# Patient Record
Sex: MALE | Race: WHITE
[De-identification: names, ages, dates, MRNs, and addresses within clinical notes are randomized per-mention and may not be internally consistent; named-entity substitution may affect disease eponyms.]

---

## 2019-10-21 ENCOUNTER — HOSPITAL ENCOUNTER (OUTPATIENT)
Dept: HOSPITAL 92 - ERS | Age: 74
Setting detail: OBSERVATION
LOS: 1 days | Discharge: HOME | End: 2019-10-22
Attending: HOSPITALIST | Admitting: HOSPITALIST
Payer: COMMERCIAL

## 2019-10-21 VITALS — BODY MASS INDEX: 27.6 KG/M2

## 2019-10-21 DIAGNOSIS — Z88.5: ICD-10-CM

## 2019-10-21 DIAGNOSIS — Z95.5: ICD-10-CM

## 2019-10-21 DIAGNOSIS — Z91.041: ICD-10-CM

## 2019-10-21 DIAGNOSIS — F17.220: ICD-10-CM

## 2019-10-21 DIAGNOSIS — Z79.51: ICD-10-CM

## 2019-10-21 DIAGNOSIS — Z79.02: ICD-10-CM

## 2019-10-21 DIAGNOSIS — I25.2: ICD-10-CM

## 2019-10-21 DIAGNOSIS — Z79.899: ICD-10-CM

## 2019-10-21 DIAGNOSIS — K59.00: ICD-10-CM

## 2019-10-21 DIAGNOSIS — Z95.810: ICD-10-CM

## 2019-10-21 DIAGNOSIS — N18.9: ICD-10-CM

## 2019-10-21 DIAGNOSIS — I25.10: ICD-10-CM

## 2019-10-21 DIAGNOSIS — Z88.2: ICD-10-CM

## 2019-10-21 DIAGNOSIS — J44.9: ICD-10-CM

## 2019-10-21 DIAGNOSIS — K62.5: Primary | ICD-10-CM

## 2019-10-21 DIAGNOSIS — Z88.8: ICD-10-CM

## 2019-10-21 DIAGNOSIS — E78.5: ICD-10-CM

## 2019-10-21 DIAGNOSIS — I12.9: ICD-10-CM

## 2019-10-21 DIAGNOSIS — K22.70: ICD-10-CM

## 2019-10-21 LAB
ALBUMIN SERPL BCG-MCNC: 4.5 G/DL (ref 3.4–4.8)
ALP SERPL-CCNC: 45 U/L (ref 40–110)
ALT SERPL W P-5'-P-CCNC: 19 U/L (ref 8–55)
ANION GAP SERPL CALC-SCNC: 10 MMOL/L (ref 10–20)
AST SERPL-CCNC: 25 U/L (ref 5–34)
BASOPHILS # BLD AUTO: 0 THOU/UL (ref 0–0.2)
BASOPHILS NFR BLD AUTO: 0.4 % (ref 0–1)
BILIRUB SERPL-MCNC: 0.7 MG/DL (ref 0.2–1.2)
BUN SERPL-MCNC: 24 MG/DL (ref 8.4–25.7)
CALCIUM SERPL-MCNC: 9.5 MG/DL (ref 7.8–10.44)
CHLORIDE SERPL-SCNC: 103 MMOL/L (ref 98–107)
CO2 SERPL-SCNC: 29 MMOL/L (ref 23–31)
CREAT CL PREDICTED SERPL C-G-VRATE: 0 ML/MIN (ref 70–130)
EOSINOPHIL # BLD AUTO: 0.1 THOU/UL (ref 0–0.7)
EOSINOPHIL NFR BLD AUTO: 1.5 % (ref 0–10)
GLOBULIN SER CALC-MCNC: 2.9 G/DL (ref 2.4–3.5)
GLUCOSE SERPL-MCNC: 99 MG/DL (ref 83–110)
HGB BLD-MCNC: 14.5 G/DL (ref 14–18)
LYMPHOCYTES # BLD: 1.9 THOU/UL (ref 1.2–3.4)
LYMPHOCYTES NFR BLD AUTO: 26.9 % (ref 21–51)
MCH RBC QN AUTO: 32 PG (ref 27–31)
MCV RBC AUTO: 92.8 FL (ref 78–98)
MONOCYTES # BLD AUTO: 0.6 THOU/UL (ref 0.11–0.59)
MONOCYTES NFR BLD AUTO: 8.6 % (ref 0–10)
NEUTROPHILS # BLD AUTO: 4.5 THOU/UL (ref 1.4–6.5)
NEUTROPHILS NFR BLD AUTO: 62.6 % (ref 42–75)
PLATELET # BLD AUTO: 165 THOU/UL (ref 130–400)
POTASSIUM SERPL-SCNC: 3.8 MMOL/L (ref 3.5–5.1)
RBC # BLD AUTO: 4.54 MILL/UL (ref 4.7–6.1)
SODIUM SERPL-SCNC: 138 MMOL/L (ref 136–145)
WBC # BLD AUTO: 7.2 THOU/UL (ref 4.8–10.8)

## 2019-10-21 PROCEDURE — 36415 COLL VENOUS BLD VENIPUNCTURE: CPT

## 2019-10-21 PROCEDURE — 96360 HYDRATION IV INFUSION INIT: CPT

## 2019-10-21 PROCEDURE — 96361 HYDRATE IV INFUSION ADD-ON: CPT

## 2019-10-21 PROCEDURE — 94640 AIRWAY INHALATION TREATMENT: CPT

## 2019-10-21 PROCEDURE — 80053 COMPREHEN METABOLIC PANEL: CPT

## 2019-10-21 PROCEDURE — 85025 COMPLETE CBC W/AUTO DIFF WBC: CPT

## 2019-10-21 PROCEDURE — 99285 EMERGENCY DEPT VISIT HI MDM: CPT

## 2019-10-21 PROCEDURE — G0378 HOSPITAL OBSERVATION PER HR: HCPCS

## 2019-10-21 PROCEDURE — 86901 BLOOD TYPING SEROLOGIC RH(D): CPT

## 2019-10-21 PROCEDURE — 86900 BLOOD TYPING SEROLOGIC ABO: CPT

## 2019-10-21 PROCEDURE — 86850 RBC ANTIBODY SCREEN: CPT

## 2019-10-21 PROCEDURE — 74176 CT ABD & PELVIS W/O CONTRAST: CPT

## 2019-10-21 NOTE — CT
CT Abdomen Pelvis WO Con

10/21/2019 9:50 PM



HISTORY:

Left lower quadrant abdominal pain



COMPARISON:

5/8/2014



Technique: 

Multiple contiguous axial CT images are obtained through the abdomen and pelvis without IV contrast. 
Coronal reformats are provided.



FINDINGS:

This examination is limited for the evaluation of solid organs and vascular structures due to the lac
k of intravenous contrast.



Lower Chest: Dense vascular calcifications are seen in the coronary arteries. There is partial visual
ization of a single lead right ventricular AICD device. Median sternotomy wires are seen. There is

minimal bibasilar atelectasis.



Abdomen:

Liver: Grossly normal nonenhanced CT appearance.

Gallbladder: Decompressed.

Pancreas: Grossly normal nonenhanced CT appearance.

Spleen: Grossly normal nonenhanced CT appearance.

Adrenals: Grossly normal nonenhanced CT appearance.

Kidneys: A subcentimeter too small to characterize hypodense lesion is seen in the midportion left ki
dney stable from prior exam. No renal calculi or hydronephrosis is identified.

Ureters: No ureteral calculus is seen..



Pelvis:

Urinary bladder: within normal limits.

Reproductive Organs: No pelvic masses.



Lymph Nodes: No enlarged lymph nodes.



Bowel: A moderate amount of retained fecal material is seen in the rectum. There is also evidence of 
gas seen just anterior to the level of the distended rectum suggesting perforation with gas

dissecting adjacent to the rectum and seen at the base of the penis and in the scrotum and in the glu
teal fat adjacent to the gluteal cleft.

Appendix: Not visualized, there are no secondary signs to suggest appendicitis. 



Peritoneum: No free fluid, free air, or  fluid collection.

Retroperitoneum: within normal limits.



Vessels: Vascular calcifications are seen in the abdominal aorta and involving the iliac and visualiz
ed femoral arteries..



Abdominal Wall: Small fat-containing umbilical hernia is present. Surgical clips are seen in the left
 inguinal region.



Bones: Degenerative changes are again seen in the spine.  



IMPRESSION:

1. Moderate amount of retained fecal material in the rectum with evidence of perforation with gas see
n just anterior to the rectum which appears to communicate with gas within the rectum with thinning

of the anterior rectal wall. There is also gas dissecting inferiorly in a perirectal location with ga
s seen in the gluteal soft tissues adjacent to the gluteal cleft and at the base of the penis and

extending into the scrotum. Surgical consultation is recommended.

2. Subcentimeter difficult to characterize hypodense lesion left kidney stable from prior exam.

3. Tiny fat-containing umbilical hernia.

4. Moderate amount retained fecal material in the rectum. 

5. Above findings discussed with Dr. Mcneill in the emergency department on 10/21/2019 at 2257 hours.




Reported By: Rocky Resendiz 

Electronically Signed:  10/21/2019 11:00 PM

## 2019-10-22 VITALS — TEMPERATURE: 97.6 F | DIASTOLIC BLOOD PRESSURE: 65 MMHG | SYSTOLIC BLOOD PRESSURE: 138 MMHG

## 2019-10-22 LAB
ALBUMIN SERPL BCG-MCNC: 4.3 G/DL (ref 3.4–4.8)
ALP SERPL-CCNC: 48 U/L (ref 40–110)
ALT SERPL W P-5'-P-CCNC: 20 U/L (ref 8–55)
ANION GAP SERPL CALC-SCNC: 14 MMOL/L (ref 10–20)
AST SERPL-CCNC: 24 U/L (ref 5–34)
BASOPHILS # BLD AUTO: 0 THOU/UL (ref 0–0.2)
BASOPHILS NFR BLD AUTO: 0.7 % (ref 0–1)
BILIRUB SERPL-MCNC: 0.9 MG/DL (ref 0.2–1.2)
BUN SERPL-MCNC: 22 MG/DL (ref 8.4–25.7)
CALCIUM SERPL-MCNC: 9.5 MG/DL (ref 7.8–10.44)
CHLORIDE SERPL-SCNC: 107 MMOL/L (ref 98–107)
CO2 SERPL-SCNC: 23 MMOL/L (ref 23–31)
CREAT CL PREDICTED SERPL C-G-VRATE: 60 ML/MIN (ref 70–130)
EOSINOPHIL # BLD AUTO: 0.1 THOU/UL (ref 0–0.7)
EOSINOPHIL NFR BLD AUTO: 1.8 % (ref 0–10)
GLOBULIN SER CALC-MCNC: 3 G/DL (ref 2.4–3.5)
GLUCOSE SERPL-MCNC: 91 MG/DL (ref 83–110)
HGB BLD-MCNC: 15.5 G/DL (ref 14–18)
LYMPHOCYTES # BLD: 2.1 THOU/UL (ref 1.2–3.4)
LYMPHOCYTES NFR BLD AUTO: 29.9 % (ref 21–51)
MCH RBC QN AUTO: 32 PG (ref 27–31)
MCV RBC AUTO: 92.7 FL (ref 78–98)
MONOCYTES # BLD AUTO: 0.6 THOU/UL (ref 0.11–0.59)
MONOCYTES NFR BLD AUTO: 8.2 % (ref 0–10)
NEUTROPHILS # BLD AUTO: 4.2 THOU/UL (ref 1.4–6.5)
NEUTROPHILS NFR BLD AUTO: 59.3 % (ref 42–75)
PLATELET # BLD AUTO: 159 THOU/UL (ref 130–400)
POTASSIUM SERPL-SCNC: 3.9 MMOL/L (ref 3.5–5.1)
RBC # BLD AUTO: 4.84 MILL/UL (ref 4.7–6.1)
SODIUM SERPL-SCNC: 140 MMOL/L (ref 136–145)
WBC # BLD AUTO: 7 THOU/UL (ref 4.8–10.8)

## 2019-10-22 RX ADMIN — DICYCLOMINE HYDROCHLORIDE SCH: 20 INJECTION, SOLUTION INTRAMUSCULAR at 14:43

## 2019-10-22 RX ADMIN — DICYCLOMINE HYDROCHLORIDE SCH: 20 INJECTION, SOLUTION INTRAMUSCULAR at 09:50

## 2019-10-22 RX ADMIN — DICYCLOMINE HYDROCHLORIDE SCH MG: 20 INJECTION, SOLUTION INTRAMUSCULAR at 11:32

## 2019-10-22 NOTE — CON
DATE OF CONSULTATION:  10/22/2019



REQUESTING PHYSICIAN:  Joann Huddleston MD



REASON FOR CONSULTATION:  Rectal bleeding.



HISTORY OF PRESENT ILLNESS:  Jamil Fermin is a very pleasant 74-year-old man.  He

was seen in the past by my GI colleague, Dr. Bertrand Nance.  He had an EGD in 2014

showing short-segment Madsen esophagus.  More recently, Mr. Fermin underwent

prostate biopsy on 10/11/2019 in [place].  He presented to the hospital last

night with complaints of some onset of constipation ever since then.  Then, he had a

bowel movement yesterday, which had what appeared to be a large amount of bright red

blood.  He has some ongoing mild lower abdominal discomfort with all this, but no

fever.  No nausea or vomiting.  He has been tolerating his diet just fine.  Upon

presentation, hemoglobin was normal at 15.5 and a CT of the abdomen and pelvis

demonstrated a moderate amount of retained fecal matter in the rectum with gas seen

anteriorly to the rectum communicating with the gas in the rectum and gas dissecting

inferiorly to the perirectal location in the gluteal soft tissue adjacent to the

gluteal defect at the base of the penis and extending to the scrotum.  Dr. Watts

was consulted as well and has already evaluated the patient today.  The patient is

currently feeling well.  He has not passed any more blood.  He has remained

hemodynamically stable.  No bowel movement since arrival. 



PAST MEDICAL HISTORY:  Coronary artery disease, COPD, hypertension, hyperlipidemia,

Madsen esophagus on EGD 2014, AICD placement, left foot surgery, and facial

reconstruction. 



FAMILY HISTORY:  Noncontributory.



SOCIAL HISTORY:  He lives with his wife.  No current alcohol, drug use, or smoking.



ALLERGIES:  ATIVAN, CODEINE, AND SULFAMETHOXAZOLE.



OUTPATIENT MEDICATIONS:  

1. Alprazolam.

2. Isosorbide.

3. Singulair.

4. Zetia.

5. Torsemide.

6. Ranexa.

7. Spiriva.



PHYSICAL EXAMINATION:

VITAL SIGNS:  Temperature 98.0, pulse 77, blood pressure 114/65, and 92% oxygen

saturation on room air. 

GENERAL:  No acute distress. 

HEART:  Regular rate and rhythm. 

LUNGS:  Clear to auscultation bilaterally. 

ABDOMEN:  Soft and nontender to palpation. 

EXTREMITIES:  No peripheral edema. 

SKIN:  No jaundice.  No rashes were palpable. 

HEENT:  Eyes, no scleral icterus.  Extraocular movements intact.  ENT, mucous

membranes moist.  No oral lesions. 

LYMPH:  No submandibular or supraclavicular lymphadenopathy. 

THYROID:  Nontender to palpation. 

NEURO:  Cranial nerves 2 through 12 intact bilaterally.  No focal deficits.



LABORATORY STUDIES:  Hemoglobin 15.5, WBC 7.0, and platelets 159.  Sodium 140,

potassium 3.9, BUN 22, creatinine 1.38, total bilirubin 0.9, alkaline phosphatase

48, AST 24, ALT 20, and albumin 4.3. 



ASSESSMENT AND PLAN:  

1. Rectal bleeding, single episode.

2. Acute constipation over the past week.

3. Recent prostate biopsy on 10/11/2019 in [place].

4. Free air in the pelvis, appears to be secondary to recent prostate biopsy. 



The patient has had only one bleeding episode and hemoglobin is normal.  This is

undoubtedly due to his recent prostate biopsy.  I discussed the case with Dr. Watts, evidently this degree of free air is to be expected after prostate biopsy,

and I agree it does not clinically represent any significant perforation given the

absence of fever, 

peritoneal signs, etc.  There is certainly nothing to be gained by any endoscopic

investigation.  He should be started on a bowel regimen.  Agree with the daily

MiraLAX.  We will also give magnesium citrate today.  I think he can have a diet.

We are not going to plan on any endoscopy.  No other barriers to discharge from a GI

perspective. 







Job ID:  [id number]

## 2019-10-22 NOTE — HP
CHIEF COMPLAINT:  Rectal bleeding.



HISTORY OF PRESENT ILLNESS:  The patient is a 74-year-old male with a past medical

history of CAD, hypertension, hyperlipidemia, who presents to the hospital with

complaints of rectal bleeding x1 day.  The patient states that he recently had eight

prostate biopsies done on 10/11 in [place].  The patient stated after that he

started having some bleeding when he would go to the bathroom.  However, since

Thursday he is unable to go to the bathroom.  He has been passing gas.  He denies

any fevers or chills.  The patient also states that he recently about 6 months ago

had a cardiac catheterization and had a stent placed in. 



PAST MEDICAL HISTORY:  

1. He has had a history of CAD.

2. Hypertension.

3. Hyperlipidemia.

4. COPD.



PAST SURGICAL HISTORY:  He has had nine stents placed, one recently.  He has AICD,

left foot surgery, and facial reconstruction. 



FAMILY HISTORY:  Denies any history of heart disease or stroke.



ALLERGIES:  HE IS ALLERGIC TO ATIVAN, CODEINE, AND SULFAMETHOXAZOLE.



MEDICATIONS:  He takes;

1. Alprazolam 0.5 mg three times a day.

2. Isosorbide 30 mg twice a day.

3. Singulair one tab 10 mg one daily.

4. Zetia 10 mg daily.

5. Torsemide 10 mg twice a day.

6. Ranexa 1000 mg twice a day.

7. Spiriva 1 inhalation daily.



PHYSICAL EXAMINATION:

VITAL SIGNS:  Temperature of 98.8, 96% on room air, respirations 16, blood pressure

124/82. 

GENERAL:  He is awake, alert, and oriented x3.  Does not appear in distress. 

HEENT:  Normocephalic, atraumatic.  No lymphadenopathy noted.  Pupils are equal and

reactive to light. 

CV:  S1 and S2 present.  No murmurs, rubs, gallops. 

ABDOMEN:  Soft.  Bowel sounds are present x2.  Mild pain upon palpation around his

lower pubic area. 

LUNGS:  Clear to auscultation.  No rhonchi or wheezes noted. 

EXTREMITIES:  No edema.  Pedal pulses are present x2. 

NEUROVASCULAR:  There were no focal deficits noted. 

SKIN:  No cuts, lesions, or bruises noted.



SOCIAL HISTORY:  He denies currently any alcohol use, drug use, or smoking history.

He is a full code.  Lives with his wife. 



LABORATORY DATA:  WBCs of 7.0, hemoglobin of 15.5, hematocrit of 44.9, platelets of

159.  Chemistry; sodium of 140, potassium 3.9, BUN of 22, creatinine of 1.38.  Urine

was not done.  He did have a CT of abdomen and pelvis, which indicated that he does

have moderate amount of retained fecal material in the rectum with evidence of

perforated with gas seen just anterior to the rectum, which appears to communicate

with the gas with the rectum, with thinning of the anterior rectal wall.  There is

also gas dissecting inferiorly to the perirectal location with gas seen in the

gluteal soft tissue adjacent to the gluteal defect at the base of the penis and

extending to the scrotum. 



ASSESSMENT AND PLAN:  The patient is a 74-year-old male, who presents to the

hospital with complaints of rectal bleeding. 

1. Rectal bleeding.  This is most likely secondary to his recent procedure.  His CAT

scan finding is concerning.  The patient stated that they went into the rectum to go

to the prostate, took a total of eight biopsies.  We will continue to check his H

and H.  his current H and H are stable.  His blood pressure is stable.  Surgery has

been consulted.  We will await the recommendation.  The patient might just require

some laxative.  However, the patient feels like a hard ball of stool and is unable

to pass it.  He is passing a lot of gas.  ER also consulted GI for additional

support. 

2. Coronary artery disease.  I am concerned this patient has been off his aspirin

and clopidogrel for the past 10 days.  He stated that he stopped taking the aspirin

since he read an article stating that aspirin causes cancer and I reassured him that

is quite be opposite and however in his case he needs to be on it since he had a

recent stent and there is a risk of stent closure.  I look to start him back on the

aspirin and he has agreed to do so. 

3. Hypertension.  We will continue his blood pressure medications.

4. Chronic kidney disease, stable.  We will continue to monitor.

5. The patient states that he does have prostate cancer on the biopsy that was done

in [place].  It was not done here. 







Job ID:  [id number]

## 2019-10-22 NOTE — CON
DATE OF CONSULTATION:  10/22/2019



CHIEF COMPLAINT:  Lower GI bleed.



HISTORY OF PRESENT ILLNESS:  This is a 74-year-old male, who underwent prostate

biopsy, transrectal in [place] back earlier in the month.  Last night, he

presented with bright red blood per rectum.  Seen in the Emergency Department, where

CT scan of the pelvis revealed air outside the rectum around the prostate.  The

bleeding has stopped.  His hemoglobin was normal.  He denies abdominal pain, nausea,

vomiting, fever, or chills.  He has had severe constipation in the last week.  This

morning, he has not had any more bleeding.  He has no complaints.  He is hungry. 



PAST MEDICAL HISTORY:  CAD, hypertension, hyperlipidemia, and COPD.



PAST SURGICAL HISTORY:  AICD, multiple stent placement, and left foot.



ALLERGIES:  ATIVAN, CODEINE, AND SULFA.



FAMILY HISTORY:  No history of GI malignancy.



MEDICATIONS:  Taken daily, see list.  Allergies above.



REVIEW OF SYSTEMS:  A 10-system review of systems otherwise negative as described

above. 



PHYSICAL EXAMINATION:

VITAL SIGNS:  Blood pressure is 114/65, pulse 77, and respirations 20. 

HEENT:  Sclerae anicteric.  Oropharynx clear. 

NECK:  No lymphadenopathy. 

CHEST:  Clear. 

HEART:  Regular rate and rhythm. 

ABDOMEN:  Soft, nontender, and nondistended. 

EXTREMITIES:  No ischemia or edema to extremities.



LABORATORY DATA:  White blood cell count of 7 and hemoglobin 15.  Creatinine is

1.38.  Liver function tests normal.  CT scan shows moderate amount of retained fecal

material, air dissecting around the prostate. 



ASSESSMENT:  

1. Status post prostate biopsy, now pelvic CT showing air around the prostate,

likely normal finding. 

2. History of lower gastrointestinal bleed has stopped likely secondary to prostate

biopsy. 



PLAN:  I do not think he needs any further workup for this.  We will give him some

stool softeners today.  I suspect he will be discharged tomorrow.  No plans for

surgery. 







Job ID:  [id number]

## 2019-10-23 NOTE — DIS
DATE OF ADMISSION:  10/21/2019



DATE OF DISCHARGE:  10/22/2019



ADMITTING DIAGNOSIS:  Possible rectal bleed.



FINAL DIAGNOSES:  

1. Rectal bleed, resolved.

2. Constipation, resolved.

3. Status post prostate biopsy.



HOSPITAL COURSE:  The patient was admitted for possibility of GI bleed, especially

rectal bleed after the patient underwent prostate biopsy.  It was thought that it

could be the lead coming from the biopsy site.  The urologist thought it was not

from it and GI consult was obtained, recommended no further endoscopy recommended

and the patient was started on lactulose which resolved the constipation and as

patient is tolerating diet well and ambulating, it has been concluded to transfer

the patient to outpatient care. 



DISCHARGE INSTRUCTIONS:  Discharge the patient to outpatient care, to follow up with

the urologist in [place] as scheduled.  Follow up with gastroenterologist as

scheduled.  Follow up with PCP in 2 days.  The patient is strictly instructed to

return if patient develop any fever or recurrence of symptoms. 







Job ID:  [id number]

## 2020-06-28 ENCOUNTER — HOSPITAL ENCOUNTER (EMERGENCY)
Dept: HOSPITAL 57 - BURERS | Age: 75
Discharge: HOME | End: 2020-06-28
Payer: MEDICARE

## 2020-06-28 DIAGNOSIS — J44.9: ICD-10-CM

## 2020-06-28 DIAGNOSIS — I25.2: ICD-10-CM

## 2020-06-28 DIAGNOSIS — Z79.02: ICD-10-CM

## 2020-06-28 DIAGNOSIS — R33.9: Primary | ICD-10-CM

## 2020-06-28 DIAGNOSIS — F17.220: ICD-10-CM

## 2020-06-28 DIAGNOSIS — Z20.828: ICD-10-CM

## 2020-06-28 DIAGNOSIS — R10.819: ICD-10-CM

## 2020-06-28 DIAGNOSIS — Z79.899: ICD-10-CM

## 2020-06-28 LAB
BACTERIA UR QL AUTO: (no result) HPF
PROT UR STRIP.AUTO-MCNC: 100 MG/DL
RBC UR QL AUTO: (no result) HPF (ref 0–3)
WBC UR QL AUTO: (no result) HPF (ref 0–3)

## 2020-06-28 PROCEDURE — 87086 URINE CULTURE/COLONY COUNT: CPT

## 2020-06-28 PROCEDURE — 87635 SARS-COV-2 COVID-19 AMP PRB: CPT

## 2020-06-28 PROCEDURE — 81015 MICROSCOPIC EXAM OF URINE: CPT

## 2020-06-28 PROCEDURE — U0003 INFECTIOUS AGENT DETECTION BY NUCLEIC ACID (DNA OR RNA); SEVERE ACUTE RESPIRATORY SYNDROME CORONAVIRUS 2 (SARS-COV-2) (CORONAVIRUS DISEASE [COVID-19]), AMPLIFIED PROBE TECHNIQUE, MAKING USE OF HIGH THROUGHPUT TECHNOLOGIES AS DESCRIBED BY CMS-2020-01-R: HCPCS

## 2020-06-28 PROCEDURE — 81003 URINALYSIS AUTO W/O SCOPE: CPT

## 2020-06-28 PROCEDURE — 51702 INSERT TEMP BLADDER CATH: CPT

## 2022-05-26 ENCOUNTER — HOSPITAL ENCOUNTER (OUTPATIENT)
Dept: HOSPITAL 92 - RAD | Age: 77
Discharge: HOME | End: 2022-05-26
Attending: INTERNAL MEDICINE
Payer: MEDICARE

## 2022-05-26 DIAGNOSIS — R06.00: Primary | ICD-10-CM

## 2022-05-26 PROCEDURE — 71046 X-RAY EXAM CHEST 2 VIEWS: CPT

## 2022-06-03 ENCOUNTER — HOSPITAL ENCOUNTER (OUTPATIENT)
Dept: HOSPITAL 92 - CSHLAB | Age: 77
Discharge: HOME | End: 2022-06-03
Attending: INTERNAL MEDICINE
Payer: MEDICARE

## 2022-06-03 DIAGNOSIS — Z20.822: Primary | ICD-10-CM

## 2022-06-03 PROCEDURE — U0003 INFECTIOUS AGENT DETECTION BY NUCLEIC ACID (DNA OR RNA); SEVERE ACUTE RESPIRATORY SYNDROME CORONAVIRUS 2 (SARS-COV-2) (CORONAVIRUS DISEASE [COVID-19]), AMPLIFIED PROBE TECHNIQUE, MAKING USE OF HIGH THROUGHPUT TECHNOLOGIES AS DESCRIBED BY CMS-2020-01-R: HCPCS

## 2022-06-03 PROCEDURE — U0005 INFEC AGEN DETEC AMPLI PROBE: HCPCS

## 2022-06-08 ENCOUNTER — HOSPITAL ENCOUNTER (OUTPATIENT)
Dept: HOSPITAL 92 - CSHCP | Age: 77
Discharge: HOME | End: 2022-06-08
Attending: INTERNAL MEDICINE
Payer: MEDICARE

## 2022-06-08 DIAGNOSIS — R94.2: ICD-10-CM

## 2022-06-08 DIAGNOSIS — R06.00: Primary | ICD-10-CM

## 2022-06-08 PROCEDURE — 94726 PLETHYSMOGRAPHY LUNG VOLUMES: CPT

## 2022-06-08 PROCEDURE — 94060 EVALUATION OF WHEEZING: CPT

## 2022-06-08 PROCEDURE — 94760 N-INVAS EAR/PLS OXIMETRY 1: CPT

## 2022-06-08 PROCEDURE — 94729 DIFFUSING CAPACITY: CPT
